# Patient Record
(demographics unavailable — no encounter records)

---

## 2017-09-25 NOTE — ED PDOC
HPI: Chest Pain


Time Seen by Provider: 09/25/17 21:57


Chief Complaint (Nursing): Chest Pain


Chief Complaint (Provider): Chest pain


History Per: Patient


History/Exam Limitations: no limitations


Onset/Duration Of Symptoms: Days


Current Symptoms Are (Timing): Still Present


Additional History Per: Patient


Additional Complaint(s): 


The patient is a 37yo male, with past medical history of hypertension, diabetes

, hypercholesterolemia, presents to the ED for evaluation of left sided pressure

-like chest pain, present for the week. He reports the pain comes and goes and 

is usually present in the morning; states today chest pressure got severe and 

he felt as if he will pass out. He also reports associated shortness of breath, 

sweats but denies any cough or pedal edema. Patient also reports he is 

compliant with his medications. He offers no additional medical complaints.


PCP: Dr. Ericka Lara





Past Medical History


Reviewed: Historical Data, Nursing Documentation, Vital Signs


Vital Signs: 


 Last Vital Signs











Temp  97.7 F   09/27/17 08:00


 


Pulse  62   09/27/17 09:00


 


Resp  18   09/27/17 08:00


 


BP  144/81   09/27/17 08:59


 


Pulse Ox  98   09/27/17 08:00














- Medical History


PMH: Anxiety, Diabetes, HTN, Hypercholesterolemia





- Surgical History


Surgical History: No Surg Hx





- Family History


Family History: States: Diabetes, Hypertension





- Social History


Current smoker - smoking cessation education provided: No


Alcohol: Occasional


Drugs: Denies





- Immunization History


Hx Tetanus Toxoid Vaccination: No


Hx Influenza Vaccination: No


Hx Pneumococcal Vaccination: No





- Home Medications


Home Medications: 


 Ambulatory Orders











 Medication  Instructions  Recorded


 


ALPRAZolam [Xanax] 1 mg PO BID 09/26/17


 


Sitagliptin Phos/Metformin HCl 1 tab PO DAILY 09/26/17





[Janumet 50-1,000 mg Tablet]  


 


Valsartan/Hydrochlorothiazide 1 tab PO DAILY 09/26/17





[Valsartan-Hctz 320-25 mg Tab]  


 


amLODIPine [Norvasc] 10 mg PO DAILY 09/26/17


 


Aspirin [Ecotrin] 81 mg PO DAILY #30 tabec 09/27/17


 


Atorvastatin [Lipitor] 10 mg PO DAILY #30 tab 09/27/17














- Allergies


Allergies/Adverse Reactions: 


 Allergies











Allergy/AdvReac Type Severity Reaction Status Date / Time


 


No Known Allergies Allergy   Verified 07/12/17 08:39














Review of Systems


ROS Statement: Except As Marked, All Systems Reviewed And Found Negative (as 

per HPI)


Constitutional: Positive for: Sweats


Cardiovascular: Positive for: Chest Pain (pressure like, left sided)


Respiratory: Positive for: Shortness of Breath.  Negative for: Cough, Sputum


Musculoskeletal: Negative for: Other (leg swelling)





Physical Exam





- Reviewed


Nursing Documentation Reviewed: Yes


Vital Signs Reviewed: Yes





- Physical Exam


Appears: Positive for: Non-toxic, In Acute Distress (mild painful)


Head Exam: Positive for: ATRAUMATIC, NORMOCEPHALIC


Skin: Positive for: Warm, Dry


Eye Exam: Positive for: EOMI, PERRL


ENT: Negative for: Pharyngeal Erythema, Tonsillar Exudate


Neck: Positive for: Painless ROM, Supple


Cardiovascular/Chest: Positive for: Regular Rate, Rhythm, Chest Non Tender.  

Negative for: JVD, Murmur


Respiratory: Positive for: Normal Breath Sounds.  Negative for: Accessory 

Muscle Use, Wheezing, Respiratory Distress


Gastrointestinal/Abdominal: Positive for: Soft.  Negative for: Tenderness


Back: Positive for: Normal Inspection.  Negative for: Decreased ROM


Extremity: Positive for: Normal ROM.  Negative for: Deformity


Lymphatic: Negative for: Adenopathy


Neurologic/Psych: Positive for: Alert.  Negative for: Motor/Sensory Deficits





- Laboratory Results


Result Diagrams: 


 09/25/17 23:00





 09/25/17 23:00





- ECG


ECG: Positive for: Interpreted By Me


ECG Rhythm: Positive for: Sinus Rhythm, Nonspecific Changes


O2 Sat by Pulse Oximetry: 96 (RA)


Pulse Ox Interpretation: Normal





- Radiology


X-Ray: Interpreted by Me


X-Ray Interpretation: No Acute Disease





Medical Decision Making


Medical Decision Making: 


Impression: Chest pain with cardiac risk factors; Patient will need to be 

hospitalized for serial enzymes to rule out ACS pending ER workup


Plan:


-- Labs


-- EKG


-- CXR





--------------------------------------------------------------------------------

----------------- 


Scribe Attestation:


Documented by Safia Morel, acting as a scribe for Shannan Grove MD.





Provider Scribe Attestation:


All medical record entries made by the Scribe were at my direction and 

personally dictated by me. I have reviewed the chart and agree that the record 

accurately reflects my personal performance of the history, physical exam, 

medical decision making, and the department course for this patient. I have 

also personally directed, reviewed, and agree with the discharge instructions 

and disposition.








Disposition





- Clinical Impression


Clinical Impression: 


 Chest pain





Counseled Patient/Family Regarding: Studies Performed, Diagnosis





- Disposition


Disposition Time: 22:00


Condition: STABLE





- Pt Status Changed To:


Hospital Disposition Of: Observation





- POA


Present On Arrival: None

## 2017-09-26 NOTE — CP.PCM.HP
History of Present Illness





- History of Present Illness


History of Present Illness: 





A 36 year old male came for pain on chest and left arm.


It started around 8 PM when he was lifting weights at a gym. His nephew drove 

him here.


The pain started on left arm, along the course of vein like vein is burning. He 

had pressure like 6/10 chest pain like needles are inside.


He could not breathe.


He has a history of hypertension, diabetes and anxiety. He has been taking 1 mg 

of xanax two times a day as needed.


Her mother , but she had both hypertension and diabetes.


He denies smoking and his primary doctor works at [employer].





Present on Admission





- Present on Admission


Any Indicators Present on Admission: No


History of DVT/PE: No


History of Uncontrolled Diabetes: No


Urinary Catheter: No


Decubitus Ulcer Present: No





Review of Systems





- Constitutional


Constitutional: absent: Night Sweats, Weight Loss





- EENT


Eyes: absent: Blurred Vision





- Cardiovascular


Cardiovascular: Chest Pain.  absent: Irregular Heart Rhythm, Pedal Edema, Rapid 

Heart Rate





- Respiratory


Respiratory: absent: Cough





- Gastrointestinal


Gastrointestinal: absent: Nausea





- Genitourinary


Genitourinary: absent: Dysuria





Past Patient History





- Past Social History


Alcohol: Occasional


Drugs: Denies





- CARDIAC


Hx Hypercholesterolemia: Yes


Hx Hypertension: Yes





- ENDOCRINE/METABOLIC


Hx Diabetes Mellitus Type 2: Yes





- PSYCHIATRIC


Hx Anxiety: Yes





- SURGICAL HISTORY


Hx Surgeries: No





- ANESTHESIA


Hx Anesthesia: Yes


Hx Anesthesia Reactions: No





Meds


Allergies/Adverse Reactions: 


 Allergies











Allergy/AdvReac Type Severity Reaction Status Date / Time


 


No Known Allergies Allergy   Verified 17 08:39














Physical Exam





- Constitutional


Appears: No Acute Distress





- Neck Exam


Neck exam: Negative for: Meningismus, Normal Inspection





- Respiratory Exam


Respiratory Exam: Chest Wall Tenderness (left side upper), Clear to 

Auscultation Bilateral, NORMAL BREATHING PATTERN





- Cardiovascular Exam


Cardiovascular Exam: REGULAR RHYTHM





- GI/Abdominal Exam


GI & Abdominal Exam: Normal Bowel Sounds





Results





- Vital Signs


Recent Vital Signs: 





 Last Vital Signs











Temp  98.1 F   17 21:29


 


Pulse  55 L  17 05:43


 


Resp  18   17 05:43


 


BP  110/74   17 05:43


 


Pulse Ox  97   17 05:43














- Labs


Result Diagrams: 


 17 23:00





 17 23:00


Labs: 





 Laboratory Results - last 24 hr











  17





  23:00 23:00 23:00


 


WBC   10.9 H 


 


RBC   4.91 


 


Hgb   14.2 


 


Hct   42.8 


 


MCV   87.1 


 


MCH   28.9 


 


MCHC   33.1 


 


RDW   13.6 


 


Plt Count   270 


 


MPV   8.8 


 


Neut % (Auto)   65.8 


 


Lymph % (Auto)   24.4 


 


Mono % (Auto)   7.5 


 


Eos % (Auto)   1.8 


 


Baso % (Auto)   0.5 


 


Neut #   7.2 H 


 


Lymph #   2.7 


 


Mono #   0.8 


 


Eos #   0.2 


 


Baso #   0.1 


 


PT    12.3


 


INR    1.2


 


APTT    29.1


 


D-Dimer, Quantitative    159


 


Sodium  140  


 


Potassium  3.6  


 


Chloride  99  


 


Carbon Dioxide  25  


 


Anion Gap  19  


 


BUN  16  


 


Creatinine  0.9  


 


Est GFR ( Amer)  > 60  


 


Est GFR (Non-Af Amer)  > 60  


 


Random Glucose  140 H  


 


Calcium  9.4  


 


Phosphorus  3.5  


 


Magnesium  2.1  


 


Total Bilirubin  0.4  


 


AST  21  


 


ALT  39  


 


Alkaline Phosphatase  60  


 


Troponin I  < 0.0120  


 


NT-Pro-B Natriuret Pep  < 11.1  


 


Total Protein  6.8  


 


Albumin  4.1  


 


Globulin  2.6  


 


Albumin/Globulin Ratio  1.6  


 


Urine Opiates Screen   


 


Urine Methadone Screen   


 


Ur Barbiturates Screen   


 


Ur Phencyclidine Scrn   


 


Ur Amphetamines Screen   


 


U Benzodiazepines Scrn   


 


U Oth Cocaine Metabols   


 


U Cannabinoids Screen   


 


Blood Type   


 


Antibody Screen   


 


BBK History Checked   














  17





  23:00 23:25 05:40


 


WBC   


 


RBC   


 


Hgb   


 


Hct   


 


MCV   


 


MCH   


 


MCHC   


 


RDW   


 


Plt Count   


 


MPV   


 


Neut % (Auto)   


 


Lymph % (Auto)   


 


Mono % (Auto)   


 


Eos % (Auto)   


 


Baso % (Auto)   


 


Neut #   


 


Lymph #   


 


Mono #   


 


Eos #   


 


Baso #   


 


PT   


 


INR   


 


APTT   


 


D-Dimer, Quantitative   


 


Sodium   


 


Potassium   


 


Chloride   


 


Carbon Dioxide   


 


Anion Gap   


 


BUN   


 


Creatinine   


 


Est GFR ( Amer)   


 


Est GFR (Non-Af Amer)   


 


Random Glucose   


 


Calcium   


 


Phosphorus   


 


Magnesium   


 


Total Bilirubin   


 


AST   


 


ALT   


 


Alkaline Phosphatase   


 


Troponin I    < 0.0120


 


NT-Pro-B Natriuret Pep   


 


Total Protein   


 


Albumin   


 


Globulin   


 


Albumin/Globulin Ratio   


 


Urine Opiates Screen   Negative 


 


Urine Methadone Screen   Negative 


 


Ur Barbiturates Screen   Negative 


 


Ur Phencyclidine Scrn   Negative 


 


Ur Amphetamines Screen   Negative 


 


U Benzodiazepines Scrn   Positive 


 


U Oth Cocaine Metabols   Negative 


 


U Cannabinoids Screen   Negative 


 


Blood Type  O POSITIVE  


 


Antibody Screen  Negative  


 


BBK History Checked  No verified bt  














Assessment & Plan





- Assessment and Plan (Free Text)


Assessment: 





Chest pain


R/O ACS


history of HTN, DM


Plan: 





check troponin every 8 hours x 3


cardiology consult





- Date & Time


Date: 17


Time: 07:54

## 2017-09-26 NOTE — CP.PCM.CON
History of Present Illness





- History of Present Illness


History of Present Illness: 





35 y/o male admitted with chest pain





Pain is left sided "like a knife sticking me"


Sharp like / pinpoint / reproducible with palpation





Started after lifting weights at the gym





also c/o some lightheadedness


Temp was 90 degrees nad he was not taking fluids ? Dehydration





Troponin: neg x 3





EKG: normal





PMH:


Hypertension


Hyperlipidemia


DM


Anxiety





Past Patient History





- Past Medical History & Family History


Past Medical History?: Yes





- Past Social History


Smoking Status: Never Smoked





- CARDIAC


Hx Cardiac Disorders: Yes


Hx Hypercholesterolemia: Yes


Hx Hypertension: Yes





- PULMONARY


Hx Respiratory Disorders: No





- NEUROLOGICAL


Hx Neurological Disorder: No





- HEENT


Hx HEENT Problems: No





- RENAL


Hx Chronic Kidney Disease: No





- ENDOCRINE/METABOLIC


Hx Endocrine Disorders: Yes


Hx Diabetes Mellitus Type 2: Yes





- HEMATOLOGICAL/ONCOLOGICAL


Hx Blood Disorders: No


Hx AIDS: No


Hx Human Immunodeficiency Virus (HIV): No





- INTEGUMENTARY


Hx Dermatological Problems: No





- MUSCULOSKELETAL/RHEUMATOLOGICAL


Hx Musculoskeletal Disorders: No


Hx Falls: No





- GASTROINTESTINAL


Hx Gastrointestinal Disorders: No





- GENITOURINARY/GYNECOLOGICAL


Hx Genitourinary Disorders: No





- PSYCHIATRIC


Hx Psychophysiologic Disorder: Yes


Hx Anxiety: Yes





- SURGICAL HISTORY


Hx Surgeries: No





- ANESTHESIA


Hx Anesthesia: No


Hx Anesthesia Reactions: No





Meds


Allergies/Adverse Reactions: 


 Allergies











Allergy/AdvReac Type Severity Reaction Status Date / Time


 


No Known Allergies Allergy   Verified 07/12/17 08:39














- Medications


Medications: 


 Current Medications





Alprazolam (Xanax)  1 mg PO BID [facility]


   Last Admin: 09/26/17 11:26 Dose:  1 mg


Amlodipine Besylate (Norvasc)  10 mg PO DAILY [facility]


Aspirin (Ecotrin)  81 mg PO DAILY [facility]


Atorvastatin Calcium (Lipitor)  10 mg PO DAILY [facility]


Hydrochlorothiazide (Hydrodiuril)  25 mg PO DAILY [facility]


Ibuprofen (Motrin Tab)  400 mg PO Q8 [facility]


Metformin HCl (Glucophage)  1,000 mg PO DAILY [facility]


Morphine Sulfate (Morphine)  2 mg IVP Q6 PRN


   PRN Reason: Pain, moderate (4-7)


   Last Admin: 09/26/17 08:13 Dose:  2 mg


Sitagliptin Phosphate (Januvia)  50 mg PO DAILY [facility]


Valsartan (Diovan)  320 mg PO DAILY [facility]











Results





- Vital Signs


Recent Vital Signs: 


 Last Vital Signs











Temp  98.0 F   09/26/17 10:33


 


Pulse  78   09/26/17 11:42


 


Resp  18   09/26/17 11:42


 


BP  141/85   09/26/17 10:33


 


Pulse Ox  99   09/26/17 11:42














- Labs


Result Diagrams: 


 09/25/17 23:00





 09/25/17 23:00


Labs: 


 Laboratory Results - last 24 hr











  09/25/17 09/25/17 09/25/17





  23:00 23:00 23:00


 


WBC   10.9 H 


 


RBC   4.91 


 


Hgb   14.2 


 


Hct   42.8 


 


MCV   87.1 


 


MCH   28.9 


 


MCHC   33.1 


 


RDW   13.6 


 


Plt Count   270 


 


MPV   8.8 


 


Neut % (Auto)   65.8 


 


Lymph % (Auto)   24.4 


 


Mono % (Auto)   7.5 


 


Eos % (Auto)   1.8 


 


Baso % (Auto)   0.5 


 


Neut #   7.2 H 


 


Lymph #   2.7 


 


Mono #   0.8 


 


Eos #   0.2 


 


Baso #   0.1 


 


PT    12.3


 


INR    1.2


 


APTT    29.1


 


D-Dimer, Quantitative    159


 


Sodium  140  


 


Potassium  3.6  


 


Chloride  99  


 


Carbon Dioxide  25  


 


Anion Gap  19  


 


BUN  16  


 


Creatinine  0.9  


 


Est GFR ( Amer)  > 60  


 


Est GFR (Non-Af Amer)  > 60  


 


POC Glucose (mg/dL)   


 


Random Glucose  140 H  


 


Calcium  9.4  


 


Phosphorus  3.5  


 


Magnesium  2.1  


 


Total Bilirubin  0.4  


 


AST  21  


 


ALT  39  


 


Alkaline Phosphatase  60  


 


Troponin I  < 0.0120  


 


NT-Pro-B Natriuret Pep  < 11.1  


 


Total Protein  6.8  


 


Albumin  4.1  


 


Globulin  2.6  


 


Albumin/Globulin Ratio  1.6  


 


Urine Opiates Screen   


 


Urine Methadone Screen   


 


Ur Barbiturates Screen   


 


Ur Phencyclidine Scrn   


 


Ur Amphetamines Screen   


 


U Benzodiazepines Scrn   


 


U Oth Cocaine Metabols   


 


U Cannabinoids Screen   


 


Blood Type   


 


Antibody Screen   


 


BBK History Checked   














  09/25/17 09/25/17 09/26/17





  23:00 23:25 05:40


 


WBC   


 


RBC   


 


Hgb   


 


Hct   


 


MCV   


 


MCH   


 


MCHC   


 


RDW   


 


Plt Count   


 


MPV   


 


Neut % (Auto)   


 


Lymph % (Auto)   


 


Mono % (Auto)   


 


Eos % (Auto)   


 


Baso % (Auto)   


 


Neut #   


 


Lymph #   


 


Mono #   


 


Eos #   


 


Baso #   


 


PT   


 


INR   


 


APTT   


 


D-Dimer, Quantitative   


 


Sodium   


 


Potassium   


 


Chloride   


 


Carbon Dioxide   


 


Anion Gap   


 


BUN   


 


Creatinine   


 


Est GFR ( Amer)   


 


Est GFR (Non-Af Amer)   


 


POC Glucose (mg/dL)   


 


Random Glucose   


 


Calcium   


 


Phosphorus   


 


Magnesium   


 


Total Bilirubin   


 


AST   


 


ALT   


 


Alkaline Phosphatase   


 


Troponin I    < 0.0120


 


NT-Pro-B Natriuret Pep   


 


Total Protein   


 


Albumin   


 


Globulin   


 


Albumin/Globulin Ratio   


 


Urine Opiates Screen   Negative 


 


Urine Methadone Screen   Negative 


 


Ur Barbiturates Screen   Negative 


 


Ur Phencyclidine Scrn   Negative 


 


Ur Amphetamines Screen   Negative 


 


U Benzodiazepines Scrn   Positive 


 


U Oth Cocaine Metabols   Negative 


 


U Cannabinoids Screen   Negative 


 


Blood Type  O POSITIVE  


 


Antibody Screen  Negative  


 


BBK History Checked  No verified bt  














  09/26/17





  12:30


 


WBC 


 


RBC 


 


Hgb 


 


Hct 


 


MCV 


 


MCH 


 


MCHC 


 


RDW 


 


Plt Count 


 


MPV 


 


Neut % (Auto) 


 


Lymph % (Auto) 


 


Mono % (Auto) 


 


Eos % (Auto) 


 


Baso % (Auto) 


 


Neut # 


 


Lymph # 


 


Mono # 


 


Eos # 


 


Baso # 


 


PT 


 


INR 


 


APTT 


 


D-Dimer, Quantitative 


 


Sodium 


 


Potassium 


 


Chloride 


 


Carbon Dioxide 


 


Anion Gap 


 


BUN 


 


Creatinine 


 


Est GFR ( Amer) 


 


Est GFR (Non-Af Amer) 


 


POC Glucose (mg/dL)  110


 


Random Glucose 


 


Calcium 


 


Phosphorus 


 


Magnesium 


 


Total Bilirubin 


 


AST 


 


ALT 


 


Alkaline Phosphatase 


 


Troponin I 


 


NT-Pro-B Natriuret Pep 


 


Total Protein 


 


Albumin 


 


Globulin 


 


Albumin/Globulin Ratio 


 


Urine Opiates Screen 


 


Urine Methadone Screen 


 


Ur Barbiturates Screen 


 


Ur Phencyclidine Scrn 


 


Ur Amphetamines Screen 


 


U Benzodiazepines Scrn 


 


U Oth Cocaine Metabols 


 


U Cannabinoids Screen 


 


Blood Type 


 


Antibody Screen 


 


BBK History Checked 














Assessment & Plan


(1) Chest wall pain


Assessment and Plan: 


Musculo-skeletal pain





pt may be discharged


Status: Acute   





(2) Hypertension


Status: Acute

## 2017-09-26 NOTE — CARD
--------------- APPROVED REPORT --------------





EXAM: Two-dimensional and M-mode echocardiogram with Doppler and 

color Doppler.



Other Information 

Quality : GoodRhythm : NSR



INDICATION

Chest Pain 



2D DIMENSIONS 

IVSd1.12   (0.7-1.1cm)LVDd4.50   (3.9-5.9cm)

LVOT Diameter2.16   (1.8-2.4cm)PWd0.96   (0.7-1.1cm)

IVSs1.61   (0.8-1.2cm)LVDs2.66   (2.5-4.0cm)

FS (%) 40.8   %PWs1.49   (0.8-1.2cm)



M-Mode DIMENSIONS 

Left Atrium (MM)4.00   (2.5-4.0cm)IVSd0.94   (0.7-1.1cm)

Aortic Root3.60   (2.2-3.7cm)LVDd6.65   (4.0-5.6cm)

Aortic Cusp Exc.2.71   (1.5-2.0cm)PWd1.18   (0.7-1.1cm)

IVSs1.74   cmFS (%) 34   %

LVDs4.41   (2.0-3.8cm)PWs1.38   cm



Mitral Valve

MV E Mxvnwfdo42.9cm/sMV DECEL [id number] A Rylphztr16.6cm/s

MV BNX95lwW/A ratio1.6MVA (PHT)3.39cm2



TDI

Lateral E' Peak V16.05cm/sMedial E' Peak V10.84cm/sE/Lateral 

E'4.2

E/Medial E'6.2



Pulmonary Valve

PV Peak Yxpfxwly799.6cm/s



Tricuspid Valve

TR Peak [id number]/sRAP [id number] Peak Gr.17mmHg

[id number]



 LEFT VENTRICLE 

The left ventricle is normal in size.

There is normal left ventricular wall thickness.

The left ventricular function is normal.

The left ventricular ejection fraction is - 70%.

There is normal LV segmental wall motion.

The left ventricular diastolic function is normal.

No left ventricle thrombus noted on this study.

There is no ventricular septal defect visualized.

There is no left ventricular aneurysm. 

There is no mass noted in the left ventricle.



 RIGHT VENTRICLE 

The right ventricle is normal size.

There is normal right ventricular wall thickness.

The right ventricular systolic function is normal.



 ATRIA 

The left atrium size is normal.

There is no thrombus suspected in the left atrium.

The right atrium size is normal.

The interatrial septum is intact with no evidence for an atrial 

septal defect.



 AORTIC VALVE 

The aortic valve is normal in structure and function.

There is very trivial aortic regurgitation. 

There is no aortic valvular stenosis. 



 MITRAL VALVE 

The mitral valve is normal in structure and function.

There is no evidence of mitral valve prolapse.

There is no mitral valve stenosis.

Mitral regurgitation is trace.



 TRICUSPID VALVE 

The tricuspid valve is normal in structure and function.

There is trace tricuspid regurgitation.

Right ventricular systolic pressure is estimated at 24 mmHg. 

There is no tricuspid valve prolapse or vegetation.

There is no tricuspid valve stenosis. 



 PULMONIC VALVE 

The pulmonary valve is normal in structure and function.

There is no pulmonic valvular regurgitation. 



 GREAT VESSELS 

The aortic root is normal in size.

The IVC is normal in size and collapses >50% with inspiration.



 PERICARDIAL EFFUSION 

The pericardium appears normal.

There is no pleural effusion.



<Conclusion>

The left ventricle is normal in size and wall thickness.

The left ventricular function is normal.

The left ventricular ejection fraction is - 70%.

The left atrium, right ventricle and right atrium are normal in size.

The mitral, aortic and tricuspid valves are normal.

There is trace regurgitation of the mitral and tricuspid valves.

## 2017-09-26 NOTE — RAD
HISTORY:

chest pain  



COMPARISON:

No prior.



TECHNIQUE:

Chest PA and lateral



FINDINGS:



LUNGS:

No active pulmonary disease.



PLEURA:

No significant pleural effusion identified. No pneumothorax apparent.



CARDIOVASCULAR:

Normal.



OSSEOUS STRUCTURES:

No significant abnormalities.



VISUALIZED UPPER ABDOMEN:

Normal.



OTHER FINDINGS:

None.



IMPRESSION:

No acute cardiopulmonary disease appreciated.

## 2017-09-27 NOTE — CP.PCM.PN
Subjective





- Date & Time of Evaluation


Date of Evaluation: 09/27/17


Time of Evaluation: 07:46





- Subjective


Subjective: 








intermittent chest pain


no headaches


dizziness in the morning





Objective





- Vital Signs/Intake and Output


Vital Signs (last 24 hours): 


 











Temp Pulse Resp BP Pulse Ox


 


 97.5 F L  75   16   100/60   98 


 


 09/27/17 04:43  09/27/17 04:43  09/27/17 04:43  09/27/17 04:43  09/27/17 04:43











- Medications


Medications: 


 Current Medications





Alprazolam (Xanax)  1 mg PO BID [facility]


   Last Admin: 09/26/17 17:41 Dose:  1 mg


Amlodipine Besylate (Norvasc)  10 mg PO DAILY [facility]


   Last Admin: 09/26/17 12:58 Dose:  10 mg


Aspirin (Ecotrin)  81 mg PO DAILY [facility]


   Last Admin: 09/26/17 13:03 Dose:  81 mg


Atorvastatin Calcium (Lipitor)  10 mg PO DAILY [facility]


   Last Admin: 09/26/17 12:59 Dose:  10 mg


Hydrochlorothiazide (Hydrodiuril)  25 mg PO DAILY [facility]


   Last Admin: 09/26/17 12:58 Dose:  25 mg


Ibuprofen (Motrin Tab)  400 mg PO Q8 [facility]


   Last Admin: 09/27/17 01:08 Dose:  Not Given


Metformin HCl (Glucophage)  1,000 mg PO DAILY [facility]


   Last Admin: 09/26/17 12:58 Dose:  1,000 mg


Morphine Sulfate (Morphine)  2 mg IVP Q6 PRN


   PRN Reason: Pain, moderate (4-7)


   Last Admin: 09/26/17 22:32 Dose:  2 mg


Sitagliptin Phosphate (Januvia)  50 mg PO DAILY [facility]


   Last Admin: 09/26/17 12:58 Dose:  50 mg


Valsartan (Diovan)  320 mg PO DAILY [facility]


   Last Admin: 09/26/17 13:01 Dose:  320 mg











- Labs


Labs: 


 





 09/25/17 23:00 





 09/25/17 23:00 





 











PT  12.3 Seconds (9.8-13.1)   09/25/17  23:00    


 


INR  1.2  (0.9-1.2)   09/25/17  23:00    


 


APTT  29.1 Seconds (25.6-37.1)   09/25/17  23:00    














- Constitutional


Appears: No Acute Distress





- Respiratory Exam


Respiratory Exam: Chest Wall Tenderness (left side), Clear to Ausculation 

Bilateral





- Cardiovascular Exam


Cardiovascular Exam: REGULAR RHYTHM





Assessment and Plan





- Assessment and Plan (Free Text)


Assessment: 





chest pain


most likely due to musculoskeletal


history of DM, HTN, anxiety


Plan: 





cardiology cleared for discharge.


ibuprofen


follow up as an out patient

## 2017-09-27 NOTE — CARD
--------------- APPROVED REPORT --------------





EKG Measurement

Heart [id number]

[id number]

[id number]

[id number]

[id number]



<Conclusion>

Normal sinus rhythm

Nonspecific ST abnormality

Abnormal ECG

## 2017-09-28 NOTE — CP.PCM.DIS
Provider





- Provider


Date of Admission: 


09/26/17 00:11





Attending physician: 


Shyanne Zuleta MD





Primary care physician: 





A 36 year old male with HTN, DM came for chest pain and left arm pain


which developed while he was working out at a gym.


He has anxiety disorder and was feeling dizziness in the morning.


chest wall tenderness





Time Spent in preparation of Discharge (in minutes): 30





Hospital Course





- Lab Results


Lab Results: 


 Most Recent Lab Values











WBC  10.9 K/uL (4.8-10.8)  H  09/25/17  23:00    


 


RBC  4.91 Mil/uL (4.40-5.90)   09/25/17  23:00    


 


Hgb  14.2 g/dL (12.0-18.0)   09/25/17  23:00    


 


Hct  42.8 % (35.0-51.0)   09/25/17  23:00    


 


MCV  87.1 fl (80.0-94.0)   09/25/17  23:00    


 


MCH  28.9 pg (27.0-31.0)   09/25/17  23:00    


 


MCHC  33.1 g/dL (33.0-37.0)   09/25/17  23:00    


 


RDW  13.6 % (11.5-14.5)   09/25/17  23:00    


 


Plt Count  270 K/uL (130-400)   09/25/17  23:00    


 


MPV  8.8 fl (7.2-11.7)   09/25/17  23:00    


 


Neut % (Auto)  65.8 % (50.0-75.0)   09/25/17  23:00    


 


Lymph % (Auto)  24.4 % (20.0-40.0)   09/25/17  23:00    


 


Mono % (Auto)  7.5 % (0.0-10.0)   09/25/17  23:00    


 


Eos % (Auto)  1.8 % (0.0-4.0)   09/25/17  23:00    


 


Baso % (Auto)  0.5 % (0.0-2.0)   09/25/17  23:00    


 


Neut #  7.2 K/uL (1.8-7.0)  H  09/25/17  23:00    


 


Lymph #  2.7 K/uL (1.0-4.3)   09/25/17  23:00    


 


Mono #  0.8 K/uL (0.0-0.8)   09/25/17  23:00    


 


Eos #  0.2 K/uL (0.0-0.7)   09/25/17  23:00    


 


Baso #  0.1 K/uL (0.0-0.2)   09/25/17  23:00    


 


PT  12.3 Seconds (9.8-13.1)   09/25/17  23:00    


 


INR  1.2  (0.9-1.2)   09/25/17  23:00    


 


APTT  29.1 Seconds (25.6-37.1)   09/25/17  23:00    


 


D-Dimer, Quantitative  159 ng/mlDDU (0-230)   09/25/17  23:00    


 


Sodium  140 mmol/l (132-148)   09/25/17  23:00    


 


Potassium  3.6 MMOL/L (3.6-5.0)   09/25/17  23:00    


 


Chloride  99 mmol/L ()   09/25/17  23:00    


 


Carbon Dioxide  25 mmol/L (22-30)   09/25/17  23:00    


 


Anion Gap  19  (10-20)   09/25/17  23:00    


 


BUN  16 mg/dl (9-20)   09/25/17  23:00    


 


Creatinine  0.9 mg/dL (0.8-1.5)   09/25/17  23:00    


 


Est GFR ( Amer)  > 60   09/25/17  23:00    


 


Est GFR (Non-Af Amer)  > 60   09/25/17  23:00    


 


POC Glucose (mg/dL)  121 mg/dL ()  H  09/27/17  05:42    


 


Random Glucose  140 mg/dL ()  H  09/25/17  23:00    


 


Hemoglobin A1c  6.0 % (4.2-6.5)   09/27/17  05:45    


 


Calcium  9.4 mg/dL (8.4-10.2)   09/25/17  23:00    


 


Phosphorus  3.5 mg/dl (2.5-4.5)   09/25/17  23:00    


 


Magnesium  2.1 MG/DL (1.6-2.3)   09/25/17  23:00    


 


Total Bilirubin  0.4 mg/dl (0.2-1.3)   09/25/17  23:00    


 


AST  21 U/L (17-59)   09/25/17  23:00    


 


ALT  39 U/L (21-72)   09/25/17  23:00    


 


Alkaline Phosphatase  60 U/L ()   09/25/17  23:00    


 


Troponin I  < 0.0120 ng/mL (0.00-0.120)   09/26/17  18:30    


 


NT-Pro-B Natriuret Pep  < 11.1 pg/ml (0-450)   09/25/17  23:00    


 


Total Protein  6.8 G/DL (6.3-8.2)   09/25/17  23:00    


 


Albumin  4.1 g/dL (3.5-5.0)   09/25/17  23:00    


 


Globulin  2.6 gm/dL (2.2-3.9)   09/25/17  23:00    


 


Albumin/Globulin Ratio  1.6  (1.0-2.1)   09/25/17  23:00    


 


Triglycerides  253 mg/DL (0-149)  H  09/27/17  05:45    


 


Cholesterol  169 mg/dL (0-199)   09/27/17  05:45    


 


LDL Cholesterol Direct  115 mg/dL (0-129)   09/27/17  05:45    


 


HDL Cholesterol  29 MG/DL (30-70)  L  09/27/17  05:45    


 


Urine Opiates Screen  Negative  (NEGATIVE)   09/25/17  23:25    


 


Urine Methadone Screen  Negative  (NEGATIVE)   09/25/17  23:25    


 


Ur Barbiturates Screen  Negative  (NEGATIVE)   09/25/17  23:25    


 


Ur Phencyclidine Scrn  Negative  (NEGATIVE)   09/25/17  23:25    


 


Ur Amphetamines Screen  Negative  (NEGATIVE)   09/25/17  23:25    


 


U Benzodiazepines Scrn  Positive  (NEGATIVE)   09/25/17  23:25    


 


U Oth Cocaine Metabols  Negative  (NEGATIVE)   09/25/17  23:25    


 


U Cannabinoids Screen  Negative  (NEGATIVE)   09/25/17  23:25    


 


Blood Type  O POSITIVE   09/25/17  23:00    


 


Blood Type Confirm  O POSITIVE   09/26/17  13:00    


 


Antibody Screen  Negative   09/25/17  23:00    


 


BBK History Checked  No verified bt   09/25/17  23:00    














- Hospital Course


Hospital Course: 





troponin was negative


cardiology consult ruled out ACS.


most likely musculoskeletal pain


out patient follow up.





- Date & Time of H&P


Date of H&P: 09/28/17


Time of H&P: 21:19





Discharge Exam





- Head Exam


Head Exam: ATRAUMATIC, NORMOCEPHALIC





Discharge Plan





- Discharge Medications


Prescriptions: 


Aspirin [Ecotrin] 81 mg PO DAILY #30 tabec


Atorvastatin [Lipitor] 10 mg PO DAILY #30 tab





- Follow Up Plan


Condition: STABLE


Disposition: HOME/ ROUTINE


Instructions:  Chest Pain (DC), Chest Pain (GEN)


Additional Instructions: 


patient cleared for discharge to home today by  and 


pt. will f/u with  outpatient


E rx sent tp [facility]


Referrals: 


Shyanne Zuleta MD [Staff Provider] -